# Patient Record
Sex: MALE | Race: OTHER | NOT HISPANIC OR LATINO | URBAN - METROPOLITAN AREA
[De-identification: names, ages, dates, MRNs, and addresses within clinical notes are randomized per-mention and may not be internally consistent; named-entity substitution may affect disease eponyms.]

---

## 2017-04-25 ENCOUNTER — INPATIENT (INPATIENT)
Facility: HOSPITAL | Age: 67
LOS: 1 days | Discharge: HOME | DRG: 195 | End: 2017-04-27
Attending: INTERNAL MEDICINE | Admitting: INTERNAL MEDICINE
Payer: COMMERCIAL

## 2017-04-25 VITALS
RESPIRATION RATE: 22 BRPM | DIASTOLIC BLOOD PRESSURE: 116 MMHG | HEART RATE: 96 BPM | OXYGEN SATURATION: 96 % | TEMPERATURE: 98 F | SYSTOLIC BLOOD PRESSURE: 188 MMHG

## 2017-04-25 DIAGNOSIS — Z90.89 ACQUIRED ABSENCE OF OTHER ORGANS: Chronic | ICD-10-CM

## 2017-04-25 DIAGNOSIS — J45.909 UNSPECIFIED ASTHMA, UNCOMPLICATED: ICD-10-CM

## 2017-04-25 LAB
ALBUMIN SERPL ELPH-MCNC: 4.4 G/DL — SIGNIFICANT CHANGE UP (ref 3.3–5)
ALP SERPL-CCNC: 53 U/L — SIGNIFICANT CHANGE UP (ref 40–120)
ALT FLD-CCNC: 41 U/L RC — SIGNIFICANT CHANGE UP (ref 10–45)
ANION GAP SERPL CALC-SCNC: 18 MMOL/L — HIGH (ref 5–17)
AST SERPL-CCNC: 47 U/L — HIGH (ref 10–40)
BASOPHILS # BLD AUTO: 0 K/UL — SIGNIFICANT CHANGE UP (ref 0–0.2)
BASOPHILS NFR BLD AUTO: 0.5 % — SIGNIFICANT CHANGE UP (ref 0–2)
BILIRUB SERPL-MCNC: 0.8 MG/DL — SIGNIFICANT CHANGE UP (ref 0.2–1.2)
BUN SERPL-MCNC: 14 MG/DL — SIGNIFICANT CHANGE UP (ref 7–23)
CALCIUM SERPL-MCNC: 9.1 MG/DL — SIGNIFICANT CHANGE UP (ref 8.4–10.5)
CHLORIDE SERPL-SCNC: 96 MMOL/L — SIGNIFICANT CHANGE UP (ref 96–108)
CO2 SERPL-SCNC: 23 MMOL/L — SIGNIFICANT CHANGE UP (ref 22–31)
CREAT SERPL-MCNC: 1.09 MG/DL — SIGNIFICANT CHANGE UP (ref 0.5–1.3)
EOSINOPHIL # BLD AUTO: 0.3 K/UL — SIGNIFICANT CHANGE UP (ref 0–0.5)
EOSINOPHIL NFR BLD AUTO: 3.4 % — SIGNIFICANT CHANGE UP (ref 0–6)
GLUCOSE SERPL-MCNC: 127 MG/DL — HIGH (ref 70–99)
HCT VFR BLD CALC: 48.1 % — SIGNIFICANT CHANGE UP (ref 39–50)
HGB BLD-MCNC: 16.8 G/DL — SIGNIFICANT CHANGE UP (ref 13–17)
LYMPHOCYTES # BLD AUTO: 1.2 K/UL — SIGNIFICANT CHANGE UP (ref 1–3.3)
LYMPHOCYTES # BLD AUTO: 13.9 % — SIGNIFICANT CHANGE UP (ref 13–44)
MAGNESIUM SERPL-MCNC: 2.2 MG/DL — SIGNIFICANT CHANGE UP (ref 1.6–2.6)
MCHC RBC-ENTMCNC: 33.7 PG — SIGNIFICANT CHANGE UP (ref 27–34)
MCHC RBC-ENTMCNC: 35 GM/DL — SIGNIFICANT CHANGE UP (ref 32–36)
MCV RBC AUTO: 96.3 FL — SIGNIFICANT CHANGE UP (ref 80–100)
MONOCYTES # BLD AUTO: 0.8 K/UL — SIGNIFICANT CHANGE UP (ref 0–0.9)
MONOCYTES NFR BLD AUTO: 9.3 % — SIGNIFICANT CHANGE UP (ref 2–14)
NEUTROPHILS # BLD AUTO: 6.6 K/UL — SIGNIFICANT CHANGE UP (ref 1.8–7.4)
NEUTROPHILS NFR BLD AUTO: 72.9 % — SIGNIFICANT CHANGE UP (ref 43–77)
PHOSPHATE SERPL-MCNC: 3.3 MG/DL — SIGNIFICANT CHANGE UP (ref 2.5–4.5)
PLATELET # BLD AUTO: 221 K/UL — SIGNIFICANT CHANGE UP (ref 150–400)
POTASSIUM SERPL-MCNC: 5.3 MMOL/L — SIGNIFICANT CHANGE UP (ref 3.5–5.3)
POTASSIUM SERPL-SCNC: 5.3 MMOL/L — SIGNIFICANT CHANGE UP (ref 3.5–5.3)
PROT SERPL-MCNC: 8 G/DL — SIGNIFICANT CHANGE UP (ref 6–8.3)
RAPID RVP RESULT: DETECTED
RBC # BLD: 5 M/UL — SIGNIFICANT CHANGE UP (ref 4.2–5.8)
RBC # FLD: 11.8 % — SIGNIFICANT CHANGE UP (ref 10.3–14.5)
RV+EV RNA SPEC QL NAA+PROBE: DETECTED
SODIUM SERPL-SCNC: 137 MMOL/L — SIGNIFICANT CHANGE UP (ref 135–145)
WBC # BLD: 9 K/UL — SIGNIFICANT CHANGE UP (ref 3.8–10.5)
WBC # FLD AUTO: 9 K/UL — SIGNIFICANT CHANGE UP (ref 3.8–10.5)

## 2017-04-25 PROCEDURE — 71020: CPT | Mod: 26

## 2017-04-25 PROCEDURE — 93010 ELECTROCARDIOGRAM REPORT: CPT | Mod: NC

## 2017-04-25 PROCEDURE — 99285 EMERGENCY DEPT VISIT HI MDM: CPT | Mod: 25

## 2017-04-25 RX ORDER — LEVOTHYROXINE SODIUM 125 MCG
88 TABLET ORAL DAILY
Qty: 0 | Refills: 0 | Status: DISCONTINUED | OUTPATIENT
Start: 2017-04-25 | End: 2017-04-27

## 2017-04-25 RX ORDER — SIMVASTATIN 20 MG/1
0 TABLET, FILM COATED ORAL
Qty: 0 | Refills: 0 | COMMUNITY

## 2017-04-25 RX ORDER — ASPIRIN/CALCIUM CARB/MAGNESIUM 324 MG
81 TABLET ORAL DAILY
Qty: 0 | Refills: 0 | Status: DISCONTINUED | OUTPATIENT
Start: 2017-04-25 | End: 2017-04-26

## 2017-04-25 RX ORDER — IPRATROPIUM/ALBUTEROL SULFATE 18-103MCG
3 AEROSOL WITH ADAPTER (GRAM) INHALATION ONCE
Qty: 0 | Refills: 0 | Status: DISCONTINUED | OUTPATIENT
Start: 2017-04-25 | End: 2017-04-25

## 2017-04-25 RX ORDER — ENOXAPARIN SODIUM 100 MG/ML
40 INJECTION SUBCUTANEOUS DAILY
Qty: 0 | Refills: 0 | Status: DISCONTINUED | OUTPATIENT
Start: 2017-04-25 | End: 2017-04-26

## 2017-04-25 RX ORDER — METOPROLOL TARTRATE 50 MG
0 TABLET ORAL
Qty: 0 | Refills: 0 | COMMUNITY

## 2017-04-25 RX ORDER — ATORVASTATIN CALCIUM 80 MG/1
10 TABLET, FILM COATED ORAL AT BEDTIME
Qty: 0 | Refills: 0 | Status: DISCONTINUED | OUTPATIENT
Start: 2017-04-25 | End: 2017-04-27

## 2017-04-25 RX ORDER — LEVOTHYROXINE SODIUM 125 MCG
0 TABLET ORAL
Qty: 0 | Refills: 0 | COMMUNITY

## 2017-04-25 RX ORDER — TIOTROPIUM BROMIDE 18 UG/1
1 CAPSULE ORAL; RESPIRATORY (INHALATION) DAILY
Qty: 0 | Refills: 0 | Status: DISCONTINUED | OUTPATIENT
Start: 2017-04-25 | End: 2017-04-27

## 2017-04-25 RX ORDER — IPRATROPIUM/ALBUTEROL SULFATE 18-103MCG
3 AEROSOL WITH ADAPTER (GRAM) INHALATION
Qty: 0 | Refills: 0 | Status: COMPLETED | OUTPATIENT
Start: 2017-04-25 | End: 2017-04-25

## 2017-04-25 RX ORDER — ALBUTEROL 90 UG/1
1 AEROSOL, METERED ORAL EVERY 4 HOURS
Qty: 0 | Refills: 0 | Status: DISCONTINUED | OUTPATIENT
Start: 2017-04-25 | End: 2017-04-27

## 2017-04-25 RX ORDER — IPRATROPIUM/ALBUTEROL SULFATE 18-103MCG
3 AEROSOL WITH ADAPTER (GRAM) INHALATION EVERY 6 HOURS
Qty: 0 | Refills: 0 | Status: DISCONTINUED | OUTPATIENT
Start: 2017-04-25 | End: 2017-04-27

## 2017-04-25 RX ORDER — METOPROLOL TARTRATE 50 MG
50 TABLET ORAL DAILY
Qty: 0 | Refills: 0 | Status: DISCONTINUED | OUTPATIENT
Start: 2017-04-25 | End: 2017-04-27

## 2017-04-25 RX ADMIN — Medication 40 MILLIGRAM(S): at 12:57

## 2017-04-25 RX ADMIN — Medication 3 MILLILITER(S): at 14:49

## 2017-04-25 RX ADMIN — Medication 60 MILLIGRAM(S): at 08:01

## 2017-04-25 RX ADMIN — Medication 50 MILLIGRAM(S): at 19:05

## 2017-04-25 RX ADMIN — ATORVASTATIN CALCIUM 10 MILLIGRAM(S): 80 TABLET, FILM COATED ORAL at 21:22

## 2017-04-25 RX ADMIN — Medication 3 MILLILITER(S): at 06:45

## 2017-04-25 RX ADMIN — Medication 3 MILLILITER(S): at 20:18

## 2017-04-25 RX ADMIN — Medication 20 MILLIGRAM(S): at 21:22

## 2017-04-25 RX ADMIN — Medication 3 MILLILITER(S): at 06:44

## 2017-04-25 RX ADMIN — Medication 3 MILLILITER(S): at 06:46

## 2017-04-25 RX ADMIN — Medication 81 MILLIGRAM(S): at 14:38

## 2017-04-25 RX ADMIN — Medication 20 MILLIGRAM(S): at 17:53

## 2017-04-25 NOTE — ED PROVIDER NOTE - NS ED ROS FT
ROS: No fever/chills, no eye pain, no throat pain, no chest pain, pos shortness of breath / wheezing, no abdominal pain,  no dysuria, no muscle pain, no rashes, no focal neurologic complaints, no known mental health issues

## 2017-04-25 NOTE — H&P ADULT. - ASSESSMENT
pt  with  sob,   from  acute  bronhospasm/  wheezing,  nebs,  iv  solumedrol,  htn, .  dvt  ppx,   echo

## 2017-04-25 NOTE — ED PROVIDER NOTE - PHYSICAL EXAMINATION
Lg: A & O x 3, NAD, HEENT WNL and no facial asymmetry; lungs with bilateral wheezing and prolonged expiratory phase, heart with reg rhythm without murmur; abdomen soft obese NTND; extremities with no edema; skin with no rashes, neuro exam non focal with no motor or sensory deficits

## 2017-04-25 NOTE — H&P ADULT. - RS GEN PE MLT RESP DETAILS PC
no rales/no chest wall tenderness/respirations non-labored/wheezes/no intercostal retractions/airway patent/clear to auscultation bilaterally/normal/breath sounds equal

## 2017-04-25 NOTE — H&P ADULT. - HISTORY OF PRESENT ILLNESS
66 year old with shortness of breath and wheezing. No history of asthma or smoking but has had suboptimal breathing studies with his PMD as per patient. Patient presented with cough yesterday without fever. woke this morning with phlegm and persistent wheezing. patient states he slept almost 1 hour last night because of persistent coughing. 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 no sick contacts at home or work,  .  SEEN  IN ERR,  WITH  WHEEZING, WIFE  AT BEDSIDE,  MORBID  OBESITY, HTN,  HYPOTHYROID   PMD: Rod Mckee

## 2017-04-25 NOTE — ED ADULT NURSE NOTE - OBJECTIVE STATEMENT
65 y/o male presents to ED c/o difficulty breathing. Pt states that yesterday he began to have intermittent cough, productive w/ thick sputum. Cough continued today and pt now presents w/ audible wheezes BL, inspiratory and expiratory. Pt denies smoking history, no pmh asthma, COPD. Pt states that he had bronchitis as child but not recently. Pt denies CP, n/v, fever/chills, no numbness/tingling in extremities.

## 2017-04-25 NOTE — ED PROVIDER NOTE - ATTENDING CONTRIBUTION TO CARE
67yoM without hx of asthma/copd presents to ED with dyspnea, cough. hx abnormal pulm function tests per pt.   On exam, pt hypoxic on RA, diffuse wheezing.  A: Wheezing, hypoxia - possibly undiagnosed asthma/copd, pna  P: labs, cxr, steroids, nebs, reeval for dispo.

## 2017-04-25 NOTE — PATIENT PROFILE ADULT. - REASON FOR ADMISSION
c/o cough, and difficulty breathing at home starting 4/24/17, denies recent travel outside the country.

## 2017-04-25 NOTE — ED PROVIDER NOTE - OBJECTIVE STATEMENT
66 year old with shortness of breath and wheezing. No history of asthma or smoking but has had suboptimal breathing studies with his PMD as per patient. Patient presented with cough yesterday without fever. woke this morning with phlegm and persistent wheezing. patient states he slept almost 1 hour last night because of persistent coughing. 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 no sick contacts at home or work.     PMD: Rod Mckee

## 2017-04-25 NOTE — ED PROVIDER NOTE - PROGRESS NOTE DETAILS
Walker Dougherty DO: Pt reassessed, Diffuse wheezing. On 2L NC, pulse ox 99% (was placed when pulse ox 93% RA before deep breaths.) Resident to contact PMD. Will likely need CDU vs admit if does not improve soon. Signed out to Dr. Rey.

## 2017-04-26 LAB
ANION GAP SERPL CALC-SCNC: 18 MMOL/L — HIGH (ref 5–17)
BUN SERPL-MCNC: 11 MG/DL — SIGNIFICANT CHANGE UP (ref 7–23)
CALCIUM SERPL-MCNC: 9.6 MG/DL — SIGNIFICANT CHANGE UP (ref 8.4–10.5)
CHLORIDE SERPL-SCNC: 102 MMOL/L — SIGNIFICANT CHANGE UP (ref 96–108)
CHOLEST SERPL-MCNC: 161 MG/DL — SIGNIFICANT CHANGE UP (ref 10–199)
CO2 SERPL-SCNC: 22 MMOL/L — SIGNIFICANT CHANGE UP (ref 22–31)
CREAT SERPL-MCNC: 0.84 MG/DL — SIGNIFICANT CHANGE UP (ref 0.5–1.3)
GLUCOSE SERPL-MCNC: 128 MG/DL — HIGH (ref 70–99)
HCT VFR BLD CALC: 49 % — SIGNIFICANT CHANGE UP (ref 39–50)
HDLC SERPL-MCNC: 77 MG/DL — SIGNIFICANT CHANGE UP (ref 40–125)
HGB BLD-MCNC: 16.5 G/DL — SIGNIFICANT CHANGE UP (ref 13–17)
LIPID PNL WITH DIRECT LDL SERPL: 69 MG/DL — SIGNIFICANT CHANGE UP
MCHC RBC-ENTMCNC: 33.1 PG — SIGNIFICANT CHANGE UP (ref 27–34)
MCHC RBC-ENTMCNC: 33.7 GM/DL — SIGNIFICANT CHANGE UP (ref 32–36)
MCV RBC AUTO: 98.4 FL — SIGNIFICANT CHANGE UP (ref 80–100)
PLATELET # BLD AUTO: 257 K/UL — SIGNIFICANT CHANGE UP (ref 150–400)
POTASSIUM SERPL-MCNC: 4.7 MMOL/L — SIGNIFICANT CHANGE UP (ref 3.5–5.3)
POTASSIUM SERPL-SCNC: 4.7 MMOL/L — SIGNIFICANT CHANGE UP (ref 3.5–5.3)
RBC # BLD: 4.98 M/UL — SIGNIFICANT CHANGE UP (ref 4.2–5.8)
RBC # FLD: 12.9 % — SIGNIFICANT CHANGE UP (ref 10.3–14.5)
SODIUM SERPL-SCNC: 142 MMOL/L — SIGNIFICANT CHANGE UP (ref 135–145)
TOTAL CHOLESTEROL/HDL RATIO MEASUREMENT: 2.1 RATIO — LOW (ref 3.4–9.6)
TRIGL SERPL-MCNC: 73 MG/DL — SIGNIFICANT CHANGE UP (ref 10–149)
TSH SERPL-MCNC: 1.5 UIU/ML — SIGNIFICANT CHANGE UP (ref 0.27–4.2)
WBC # BLD: 13.4 K/UL — HIGH (ref 3.8–10.5)
WBC # FLD AUTO: 13.4 K/UL — HIGH (ref 3.8–10.5)

## 2017-04-26 PROCEDURE — 93306 TTE W/DOPPLER COMPLETE: CPT | Mod: 26

## 2017-04-26 RX ADMIN — Medication 20 MILLIGRAM(S): at 22:07

## 2017-04-26 RX ADMIN — Medication 20 MILLIGRAM(S): at 14:18

## 2017-04-26 RX ADMIN — Medication 88 MICROGRAM(S): at 06:04

## 2017-04-26 RX ADMIN — Medication 3 MILLILITER(S): at 08:50

## 2017-04-26 RX ADMIN — Medication 3 MILLILITER(S): at 23:44

## 2017-04-26 RX ADMIN — Medication 3 MILLILITER(S): at 12:52

## 2017-04-26 RX ADMIN — Medication 50 MILLIGRAM(S): at 06:04

## 2017-04-26 RX ADMIN — Medication 20 MILLIGRAM(S): at 06:04

## 2017-04-26 RX ADMIN — ATORVASTATIN CALCIUM 10 MILLIGRAM(S): 80 TABLET, FILM COATED ORAL at 22:07

## 2017-04-26 RX ADMIN — Medication 3 MILLILITER(S): at 03:30

## 2017-04-26 RX ADMIN — Medication 3 MILLILITER(S): at 18:19

## 2017-04-26 NOTE — PROVIDER CONTACT NOTE (OTHER) - SITUATION
pt requesting to speak to provider as they spoke to outpatient dr and is confused about orders. Pt is concerned that they was a name mix up as outpatient doctor has many pts by the same name

## 2017-04-27 VITALS
TEMPERATURE: 98 F | OXYGEN SATURATION: 95 % | DIASTOLIC BLOOD PRESSURE: 82 MMHG | SYSTOLIC BLOOD PRESSURE: 128 MMHG | HEART RATE: 65 BPM | RESPIRATION RATE: 18 BRPM

## 2017-04-27 PROCEDURE — 83735 ASSAY OF MAGNESIUM: CPT

## 2017-04-27 PROCEDURE — 93306 TTE W/DOPPLER COMPLETE: CPT

## 2017-04-27 PROCEDURE — 80048 BASIC METABOLIC PNL TOTAL CA: CPT

## 2017-04-27 PROCEDURE — 80061 LIPID PANEL: CPT

## 2017-04-27 PROCEDURE — 85027 COMPLETE CBC AUTOMATED: CPT

## 2017-04-27 PROCEDURE — 87798 DETECT AGENT NOS DNA AMP: CPT

## 2017-04-27 PROCEDURE — 71046 X-RAY EXAM CHEST 2 VIEWS: CPT

## 2017-04-27 PROCEDURE — 87581 M.PNEUMON DNA AMP PROBE: CPT

## 2017-04-27 PROCEDURE — 84100 ASSAY OF PHOSPHORUS: CPT

## 2017-04-27 PROCEDURE — 99285 EMERGENCY DEPT VISIT HI MDM: CPT | Mod: 25

## 2017-04-27 PROCEDURE — 93005 ELECTROCARDIOGRAM TRACING: CPT

## 2017-04-27 PROCEDURE — 84443 ASSAY THYROID STIM HORMONE: CPT

## 2017-04-27 PROCEDURE — 87486 CHLMYD PNEUM DNA AMP PROBE: CPT

## 2017-04-27 PROCEDURE — 87633 RESP VIRUS 12-25 TARGETS: CPT

## 2017-04-27 PROCEDURE — 94640 AIRWAY INHALATION TREATMENT: CPT

## 2017-04-27 PROCEDURE — 80053 COMPREHEN METABOLIC PANEL: CPT

## 2017-04-27 RX ORDER — ALBUTEROL 90 UG/1
2 AEROSOL, METERED ORAL
Qty: 1 | Refills: 0 | OUTPATIENT
Start: 2017-04-27 | End: 2017-05-12

## 2017-04-27 RX ORDER — IPRATROPIUM/ALBUTEROL SULFATE 18-103MCG
3 AEROSOL WITH ADAPTER (GRAM) INHALATION
Qty: 60 | Refills: 0 | OUTPATIENT
Start: 2017-04-27 | End: 2017-05-12

## 2017-04-27 RX ADMIN — Medication 20 MILLIGRAM(S): at 05:34

## 2017-04-27 RX ADMIN — Medication 50 MILLIGRAM(S): at 05:34

## 2017-04-27 RX ADMIN — Medication 3 MILLILITER(S): at 05:35

## 2017-04-27 RX ADMIN — Medication 3 MILLILITER(S): at 11:33

## 2017-04-27 RX ADMIN — Medication 88 MICROGRAM(S): at 05:34

## 2017-04-27 NOTE — DISCHARGE NOTE ADULT - PLAN OF CARE
improved monitor for fever  take medrol dose pack steroid as directed  continue breathing treatment as prescribed continue your thyroid medication continue your blood pressure medication

## 2017-04-27 NOTE — DISCHARGE NOTE ADULT - REASON FOR ADMISSION
c/o cough, and difficulty breathing at home starting 4/24/17, denies recent travel outside the country. c/o cough, and difficulty breathing at home.

## 2017-04-27 NOTE — DISCHARGE NOTE ADULT - PATIENT PORTAL LINK FT
“You can access the [employer] Patient Portal, offered by [facility], by registering with the following website: [email or web address]”

## 2017-04-27 NOTE — DISCHARGE NOTE ADULT - MEDICATION SUMMARY - MEDICATIONS TO TAKE
I will START or STAY ON the medications listed below when I get home from the hospital:    Medrol Dosepak 4 mg oral tablet  -- 1 dose(s) by mouth once a day  -- as directed  -- Indication: For Viral pneumonia    atorvastatin 10 mg oral tablet  -- 1 tab(s) by mouth once a day  -- Indication: For cholesterol    metoprolol succinate 50 mg oral tablet, extended release  -- 1 tab(s) by mouth once a day  -- Indication: For Hypertension    albuterol-ipratropium 2.5 mg-0.5 mg/3 mL inhalation solution  -- 3 milliliter(s) inhaled every 6 hours  -- Indication: For bronchodilator    albuterol 90 mcg/inh inhalation aerosol  -- 2 puff(s) inhaled 4 times a day, As Needed  -- as needed for wheezing  -- Indication: For bronchodilator    levothyroxine 88 mcg (0.088 mg) oral tablet  -- 1 tab(s) by mouth once a day  -- Indication: For Hypothyroid

## 2017-04-27 NOTE — DISCHARGE NOTE ADULT - CARE PLAN
Principal Discharge DX:	Viral pneumonia  Goal:	improved  Instructions for follow-up, activity and diet:	monitor for fever  take medrol dose pack steroid as directed  continue breathing treatment as prescribed  Secondary Diagnosis:	Hypothyroid  Instructions for follow-up, activity and diet:	continue your thyroid medication  Secondary Diagnosis:	Hypertension  Instructions for follow-up, activity and diet:	continue your blood pressure medication

## 2017-04-27 NOTE — DISCHARGE NOTE ADULT - HOSPITAL COURSE
to be completed by md 66 year old male PMH hypothyroidism, HTN, HLD to ER with SOB, wheeze and cough. CXR was clear. SMA-7 and CBC WNL. RVP panel was positive. EKG was NSR. TTE showed normal LV and RV function with EF 65 %.     Responded well to IV steroids and nebulizers. Likely had viral pneumonia.     Discharged to home on medrol dose pack and Nebs at home. See attached med list.

## 2017-04-27 NOTE — DISCHARGE NOTE ADULT - CARE PROVIDER_API CALL
Rod Mckee), Internal Medicine  [address] [place] [id number]  Phone: [phone]  Fax: [phone]

## 2017-04-27 NOTE — DISCHARGE NOTE ADULT - MEDICATION SUMMARY - MEDICATIONS TO STOP TAKING
I will STOP taking the medications listed below when I get home from the hospital:    betamethasone dipropionate 0.05% topical ointment  -- Apply on skin to affected area 2 times a day

## 2019-01-06 ENCOUNTER — TRANSCRIPTION ENCOUNTER (OUTPATIENT)
Age: 69
End: 2019-01-06

## 2019-01-07 ENCOUNTER — TRANSCRIPTION ENCOUNTER (OUTPATIENT)
Age: 69
End: 2019-01-07

## 2019-01-24 ENCOUNTER — EMERGENCY (EMERGENCY)
Facility: HOSPITAL | Age: 69
LOS: 1 days | Discharge: HOME | End: 2019-01-24
Attending: EMERGENCY MEDICINE
Payer: COMMERCIAL

## 2019-01-24 VITALS
HEIGHT: 74 IN | WEIGHT: 259.93 LBS | HEART RATE: 91 BPM | RESPIRATION RATE: 18 BRPM | SYSTOLIC BLOOD PRESSURE: 176 MMHG | OXYGEN SATURATION: 95 % | TEMPERATURE: 98 F | DIASTOLIC BLOOD PRESSURE: 119 MMHG

## 2019-01-24 DIAGNOSIS — Z90.89 ACQUIRED ABSENCE OF OTHER ORGANS: Chronic | ICD-10-CM

## 2019-01-24 LAB
ALBUMIN SERPL ELPH-MCNC: 4.5 G/DL — SIGNIFICANT CHANGE UP (ref 3.3–5)
ALP SERPL-CCNC: 47 U/L — SIGNIFICANT CHANGE UP (ref 40–120)
ALT FLD-CCNC: 31 U/L — SIGNIFICANT CHANGE UP (ref 10–45)
ANION GAP SERPL CALC-SCNC: 14 MMOL/L — SIGNIFICANT CHANGE UP (ref 5–17)
APTT BLD: 28 SEC — SIGNIFICANT CHANGE UP (ref 27.5–36.3)
AST SERPL-CCNC: 29 U/L — SIGNIFICANT CHANGE UP (ref 10–40)
BASE EXCESS BLDV CALC-SCNC: -0.5 MMOL/L — SIGNIFICANT CHANGE UP (ref -2–2)
BASOPHILS # BLD AUTO: 0.1 K/UL — SIGNIFICANT CHANGE UP (ref 0–0.2)
BASOPHILS NFR BLD AUTO: 0.5 % — SIGNIFICANT CHANGE UP (ref 0–2)
BILIRUB SERPL-MCNC: 1.2 MG/DL — SIGNIFICANT CHANGE UP (ref 0.2–1.2)
BUN SERPL-MCNC: 19 MG/DL — SIGNIFICANT CHANGE UP (ref 7–23)
CA-I SERPL-SCNC: 1.18 MMOL/L — SIGNIFICANT CHANGE UP (ref 1.12–1.3)
CALCIUM SERPL-MCNC: 9.1 MG/DL — SIGNIFICANT CHANGE UP (ref 8.4–10.5)
CHLORIDE BLDV-SCNC: 103 MMOL/L — SIGNIFICANT CHANGE UP (ref 96–108)
CHLORIDE SERPL-SCNC: 101 MMOL/L — SIGNIFICANT CHANGE UP (ref 96–108)
CO2 BLDV-SCNC: 25 MMOL/L — SIGNIFICANT CHANGE UP (ref 22–30)
CO2 SERPL-SCNC: 22 MMOL/L — SIGNIFICANT CHANGE UP (ref 22–31)
CREAT SERPL-MCNC: 0.95 MG/DL — SIGNIFICANT CHANGE UP (ref 0.5–1.3)
EOSINOPHIL # BLD AUTO: 0.8 K/UL — HIGH (ref 0–0.5)
EOSINOPHIL NFR BLD AUTO: 7.1 % — HIGH (ref 0–6)
GAS PNL BLDV: 134 MMOL/L — LOW (ref 136–145)
GAS PNL BLDV: SIGNIFICANT CHANGE UP
GAS PNL BLDV: SIGNIFICANT CHANGE UP
GLUCOSE BLDV-MCNC: 109 MG/DL — HIGH (ref 70–99)
GLUCOSE SERPL-MCNC: 113 MG/DL — HIGH (ref 70–99)
HCO3 BLDV-SCNC: 24 MMOL/L — SIGNIFICANT CHANGE UP (ref 21–29)
HCT VFR BLD CALC: 47.3 % — SIGNIFICANT CHANGE UP (ref 39–50)
HCT VFR BLDA CALC: 51 % — HIGH (ref 39–50)
HGB BLD CALC-MCNC: 16.7 G/DL — SIGNIFICANT CHANGE UP (ref 13–17)
HGB BLD-MCNC: 16.4 G/DL — SIGNIFICANT CHANGE UP (ref 13–17)
INR BLD: 1.01 RATIO — SIGNIFICANT CHANGE UP (ref 0.88–1.16)
LACTATE BLDV-MCNC: 1.4 MMOL/L — SIGNIFICANT CHANGE UP (ref 0.7–2)
LYMPHOCYTES # BLD AUTO: 1.7 K/UL — SIGNIFICANT CHANGE UP (ref 1–3.3)
LYMPHOCYTES # BLD AUTO: 14.2 % — SIGNIFICANT CHANGE UP (ref 13–44)
MCHC RBC-ENTMCNC: 33.8 PG — SIGNIFICANT CHANGE UP (ref 27–34)
MCHC RBC-ENTMCNC: 34.7 GM/DL — SIGNIFICANT CHANGE UP (ref 32–36)
MCV RBC AUTO: 97.4 FL — SIGNIFICANT CHANGE UP (ref 80–100)
MONOCYTES # BLD AUTO: 0.9 K/UL — SIGNIFICANT CHANGE UP (ref 0–0.9)
MONOCYTES NFR BLD AUTO: 7.3 % — SIGNIFICANT CHANGE UP (ref 2–14)
NEUTROPHILS # BLD AUTO: 8.4 K/UL — HIGH (ref 1.8–7.4)
NEUTROPHILS NFR BLD AUTO: 70.8 % — SIGNIFICANT CHANGE UP (ref 43–77)
OTHER CELLS CSF MANUAL: 22 ML/DL — SIGNIFICANT CHANGE UP (ref 18–22)
PCO2 BLDV: 40 MMHG — SIGNIFICANT CHANGE UP (ref 35–50)
PH BLDV: 7.39 — SIGNIFICANT CHANGE UP (ref 7.35–7.45)
PLATELET # BLD AUTO: 229 K/UL — SIGNIFICANT CHANGE UP (ref 150–400)
PO2 BLDV: 74 MMHG — HIGH (ref 25–45)
POTASSIUM BLDV-SCNC: 3.6 MMOL/L — SIGNIFICANT CHANGE UP (ref 3.5–5.3)
POTASSIUM SERPL-MCNC: 3.7 MMOL/L — SIGNIFICANT CHANGE UP (ref 3.5–5.3)
POTASSIUM SERPL-SCNC: 3.7 MMOL/L — SIGNIFICANT CHANGE UP (ref 3.5–5.3)
PROT SERPL-MCNC: 7.6 G/DL — SIGNIFICANT CHANGE UP (ref 6–8.3)
PROTHROM AB SERPL-ACNC: 11.6 SEC — SIGNIFICANT CHANGE UP (ref 10–12.9)
RBC # BLD: 4.86 M/UL — SIGNIFICANT CHANGE UP (ref 4.2–5.8)
RBC # FLD: 11.6 % — SIGNIFICANT CHANGE UP (ref 10.3–14.5)
SAO2 % BLDV: 95 % — HIGH (ref 67–88)
SODIUM SERPL-SCNC: 137 MMOL/L — SIGNIFICANT CHANGE UP (ref 135–145)
WBC # BLD: 11.8 K/UL — HIGH (ref 3.8–10.5)
WBC # FLD AUTO: 11.8 K/UL — HIGH (ref 3.8–10.5)

## 2019-01-24 PROCEDURE — 71046 X-RAY EXAM CHEST 2 VIEWS: CPT | Mod: 26

## 2019-01-24 PROCEDURE — 93010 ELECTROCARDIOGRAM REPORT: CPT | Mod: 59

## 2019-01-24 PROCEDURE — 99285 EMERGENCY DEPT VISIT HI MDM: CPT | Mod: 25

## 2019-01-24 RX ORDER — IPRATROPIUM/ALBUTEROL SULFATE 18-103MCG
3 AEROSOL WITH ADAPTER (GRAM) INHALATION
Qty: 0 | Refills: 0 | Status: COMPLETED | OUTPATIENT
Start: 2019-01-24 | End: 2019-01-24

## 2019-01-24 RX ADMIN — Medication 3 MILLILITER(S): at 11:00

## 2019-01-24 RX ADMIN — Medication 3 MILLILITER(S): at 22:20

## 2019-01-24 RX ADMIN — Medication 125 MILLIGRAM(S): at 22:50

## 2019-01-24 RX ADMIN — Medication 3 MILLILITER(S): at 22:50

## 2019-01-24 NOTE — ED ADULT NURSE NOTE - OBJECTIVE STATEMENT
69 y/o male presents to ED c.o URI symptoms, nasal congestion, difficulty breathing x 3 weeks. Pt says he has been to PCP and urgent care 2x each. Had xrays and blood work done which suggested pt had viral bronchitis. Was given nebulizer treatments which pt says has helped his labored breathing. Also given course of doxy and medrol pack. Symptoms haven't been worsening but haven't been getting better so pt came in to ED for further treatment. He says he feels chest tightness, "like I have a lot of mucous and phlegm." Nothing worsens Upon arrival, pt A&O x 3. Gross motor and neuro intact. Wheezing auscultated b/l. 69 y/o male presents to ED c.o URI symptoms, nasal congestion, difficulty breathing x 3 weeks. Pt says he has been to PCP and urgent care 2x each. Had xrays and blood work done which suggested pt had viral bronchitis. Was given nebulizer treatments which pt says has helped his labored breathing. Also given course of doxy and medrol pack. Symptoms haven't been worsening but haven't been getting better so pt came in to ED for further treatment. He says he feels chest tightness, "like I have a lot of mucous and phlegm." Nothing worsens Upon arrival, pt A&O x 3. Gross motor and neuro intact. Wheezing auscultated b/l. No respiratory distress noted. Safety and comfort provided.

## 2019-01-24 NOTE — ED PROVIDER NOTE - OBJECTIVE STATEMENT
68 year old male w/ pmhx HTN, HLD, hypothyroid presents to ED c/o persistent sob for 3 weeks associated with cough. Patient reports he had onset of symptoms approx 3 weeks ago and has been seen by PCP 2 times and urgent care 2 times since onset diagnosed with bronchitis and has finished course of Doxy and medrol dose pack since onset and has also been using nebulizer at home. He states he was having some relief but came to ED for persistent symptoms. Denies fevers, chills, chest pain, abdominal pain, n/v/d, urinary symptoms

## 2019-01-24 NOTE — ED PROVIDER NOTE - PROGRESS NOTE DETAILS
pt re-evaluated. O2 sat ranging from between 94-96. pt ambulated, with sat dropping to 90-91. pt with continued wheeze. due to this, plan to hold in cdu for serial nebs, re-eval in morning. plan d/w patient and wife who are agreeable. - Bao Hale MD

## 2019-01-24 NOTE — ED PROVIDER NOTE - ATTENDING CONTRIBUTION TO CARE
68M, pmh htn, hld, hypothyroidism, presents with cough, sob, x 3 weeks. pt seen by pmd x 2, also urgent care. has completed course of doxycycline and medrol dose pack, which did improve sx somewhat but worsened over last few days. also has been using nebulizer at home with some relief. cough non-productive. denies any recent travel, hemoptysis, night sweats, unexpected weight loss. denies f/c, cp, abd pain, n/v/d.    PE: NAD, NCAT, MMM, Trachea midline, Normal conjunctiva, lungs with decreased air movement, diffuse expiratory wheeze, S1/S2 RRR, Normal perfusion, 2+ radial pulses bilat, Abdomen Soft, NTND, No rebound/guarding, No LE edema, No deformity of extremities, No rashes,  No focal motor or sensory deficits.    O2 sat mid 90's, hypertensive. Exam sig for diffuse expiratory wheeze. Most c/w persistent URI/bronchitis, ?undiagnosed copd/asthma. EKG without obvious acute ischemic changes, no chest pain, very low suspicion of cardiac etiology. Also consider PE given persistence of cough, sob, check cta chest which will also eval for consolidation. Check CBC eval for anemia, cmp eval for metabolic derangement. RVP. Give nebs, steroids. Re-eval - Bao Hale MD

## 2019-01-25 VITALS
TEMPERATURE: 98 F | RESPIRATION RATE: 22 BRPM | DIASTOLIC BLOOD PRESSURE: 86 MMHG | SYSTOLIC BLOOD PRESSURE: 137 MMHG | OXYGEN SATURATION: 98 % | HEART RATE: 74 BPM

## 2019-01-25 PROBLEM — E03.9 HYPOTHYROIDISM, UNSPECIFIED: Chronic | Status: ACTIVE | Noted: 2017-04-25

## 2019-01-25 PROBLEM — I10 ESSENTIAL (PRIMARY) HYPERTENSION: Chronic | Status: ACTIVE | Noted: 2017-04-25

## 2019-01-25 LAB
RAPID RVP RESULT: DETECTED
RV+EV RNA SPEC QL NAA+PROBE: DETECTED

## 2019-01-25 PROCEDURE — 85027 COMPLETE CBC AUTOMATED: CPT

## 2019-01-25 PROCEDURE — 83605 ASSAY OF LACTIC ACID: CPT

## 2019-01-25 PROCEDURE — 93005 ELECTROCARDIOGRAM TRACING: CPT

## 2019-01-25 PROCEDURE — G0378: CPT

## 2019-01-25 PROCEDURE — 85014 HEMATOCRIT: CPT

## 2019-01-25 PROCEDURE — 84132 ASSAY OF SERUM POTASSIUM: CPT

## 2019-01-25 PROCEDURE — 99285 EMERGENCY DEPT VISIT HI MDM: CPT | Mod: 25

## 2019-01-25 PROCEDURE — 71275 CT ANGIOGRAPHY CHEST: CPT

## 2019-01-25 PROCEDURE — 87633 RESP VIRUS 12-25 TARGETS: CPT

## 2019-01-25 PROCEDURE — 71046 X-RAY EXAM CHEST 2 VIEWS: CPT

## 2019-01-25 PROCEDURE — 94640 AIRWAY INHALATION TREATMENT: CPT

## 2019-01-25 PROCEDURE — 87486 CHLMYD PNEUM DNA AMP PROBE: CPT

## 2019-01-25 PROCEDURE — 82947 ASSAY GLUCOSE BLOOD QUANT: CPT

## 2019-01-25 PROCEDURE — 87798 DETECT AGENT NOS DNA AMP: CPT

## 2019-01-25 PROCEDURE — 82435 ASSAY OF BLOOD CHLORIDE: CPT

## 2019-01-25 PROCEDURE — 84295 ASSAY OF SERUM SODIUM: CPT

## 2019-01-25 PROCEDURE — 85730 THROMBOPLASTIN TIME PARTIAL: CPT

## 2019-01-25 PROCEDURE — 82803 BLOOD GASES ANY COMBINATION: CPT

## 2019-01-25 PROCEDURE — 82330 ASSAY OF CALCIUM: CPT

## 2019-01-25 PROCEDURE — 80053 COMPREHEN METABOLIC PANEL: CPT

## 2019-01-25 PROCEDURE — 87581 M.PNEUMON DNA AMP PROBE: CPT

## 2019-01-25 PROCEDURE — 85610 PROTHROMBIN TIME: CPT

## 2019-01-25 PROCEDURE — 71275 CT ANGIOGRAPHY CHEST: CPT | Mod: 26

## 2019-01-25 PROCEDURE — 99234 HOSP IP/OBS SM DT SF/LOW 45: CPT

## 2019-01-25 PROCEDURE — 96374 THER/PROPH/DIAG INJ IV PUSH: CPT | Mod: XU

## 2019-01-25 RX ORDER — ATORVASTATIN CALCIUM 80 MG/1
1 TABLET, FILM COATED ORAL
Qty: 0 | Refills: 0 | COMMUNITY

## 2019-01-25 RX ORDER — METOPROLOL TARTRATE 50 MG
1 TABLET ORAL
Qty: 0 | Refills: 0 | COMMUNITY

## 2019-01-25 RX ORDER — LEVOTHYROXINE SODIUM 125 MCG
1 TABLET ORAL
Qty: 0 | Refills: 0 | COMMUNITY

## 2019-01-25 RX ORDER — IPRATROPIUM/ALBUTEROL SULFATE 18-103MCG
3 AEROSOL WITH ADAPTER (GRAM) INHALATION EVERY 4 HOURS
Qty: 0 | Refills: 0 | Status: DISCONTINUED | OUTPATIENT
Start: 2019-01-25 | End: 2019-01-28

## 2019-01-25 RX ORDER — IPRATROPIUM/ALBUTEROL SULFATE 18-103MCG
3 AEROSOL WITH ADAPTER (GRAM) INHALATION
Qty: 84 | Refills: 0 | OUTPATIENT
Start: 2019-01-25 | End: 2019-01-31

## 2019-01-25 RX ADMIN — Medication 3 MILLILITER(S): at 06:18

## 2019-01-25 RX ADMIN — Medication 3 MILLILITER(S): at 09:56

## 2019-01-25 RX ADMIN — Medication 100 MILLIGRAM(S): at 03:39

## 2019-01-25 RX ADMIN — Medication 3 MILLILITER(S): at 02:47

## 2019-01-25 NOTE — ED CDU PROVIDER DISPOSITION NOTE - CLINICAL COURSE
68 year old male w/ pmhx HTN, HLD, hypothyroid presents to ED c/o persistent sob for 3 weeks. + productive cough with yellow phlegm. pt has been seen by PCP 2 times and urgent care 2 times since onset and diagnosed with bronchitis. pt has finished course of Doxy and medrol dose pack and has also been using nebulizer at home. He states he was having some relief with the nebulizer but came to ED for persistent symptoms. Denies fevers, chills, chest pain, sinus congestion, sore throat, ear pain, abdominal pain, n/v/d, back pain, problems going to the bathroom/walking.   In ED, WBC 11.8, entero/rhino + on rvp, other labs unremarkable, CXR negative, CTA chest negative for PE. pt sent to CDU for duonebs, continuous pulse ox and monitoring.   In CDU, ___________. 68 year old male w/ pmhx HTN, HLD, hypothyroid presents to ED c/o persistent sob for 3 weeks. + productive cough with yellow phlegm. pt has been seen by PCP 2 times and urgent care 2 times since onset and diagnosed with bronchitis. pt has finished course of Doxy and medrol dose pack and has also been using nebulizer at home. He states he was having some relief with the nebulizer but came to ED for persistent symptoms. Denies fevers, chills, chest pain, sinus congestion, sore throat, ear pain, abdominal pain, n/v/d, back pain, problems going to the bathroom/walking.   In ED, WBC 11.8, entero/rhino + on rvp, other labs unremarkable, CXR negative, CTA chest negative for PE. pt sent to CDU for duonebs, continuous pulse ox and monitoring.   In CDU, symptoms improved, and pt was discharged home.

## 2019-01-25 NOTE — ED CDU PROVIDER INITIAL DAY NOTE - DETAILS
Asthma Exacerbation  - frequent re-eval  - vitals q4hrs  - continuous pulse ox, neb tx q 4hrs, peak flow q 4hrs, steroids  - discussed case with attending Dr. ABISAI Hale

## 2019-01-25 NOTE — ED ADULT NURSE REASSESSMENT NOTE - NS ED NURSE REASSESS COMMENT FT1
Pt received from SABRINA Hay. Pt oriented to CDU & plan of care was discussed. Pt states he feels much better. Pt denies any SOB or difficulty breathing. On examination decreased air movement noted and wheezing heard throughout. Pt placed on continuous pulse ox for further management >94% on room air. Safety & comfort measures maintained. Call bell in reach. Will continue to monitor.

## 2019-01-25 NOTE — ED CDU PROVIDER DISPOSITION NOTE - CARE PROVIDER_API CALL
Rod Mckee), Internal Medicine  [address] [place] [phone]  Phone: [phone]  Fax: [phone]

## 2019-01-25 NOTE — ED CDU PROVIDER DISPOSITION NOTE - PLAN OF CARE
1. Continue your home medications (including nebulizer as needed) along with Prednisone taper for next few days AND Tessalon Perles 100mg by mouth three times/day.  2. Drink PLENTY of fluids to stay hydrated.  3. You will need to follow-up with your PMD and/or pulmonologist in 2-3 days for your asthma exacerbation. A copy of your results were given to you to bring to your appointment.  4. Return to ER for worsening shortness of breath, fevers, or any other concerns.

## 2019-01-25 NOTE — ED CDU PROVIDER INITIAL DAY NOTE - PROGRESS NOTE DETAILS
CDU NOTE DOUGLAS BORGES: Pt resting comfortably, feeling well aside from cough and wheezing. states duoneb helped a bit. NAD, VSS. pulse ox 92-98 room air. Lungs: slight improvement of wheezing though still with diffuse wheezing. will continue monitoring and duonebs. pt feeling better with less cough and no sob or cook.  P/E - alert, nad, lungs - fine expiratory wheezing.  good air entry bilat. O2Sat 96% RA.  Pt stable for D/C.  will Rx Prednisone for 4 days and pt to f/u with PMD in the next week Pt comfortable. No complaints. Vital signs stable. Will continue to observe and reassess. b/l expiratory wheezes. -Ernestina Lindsey PA-C

## 2019-01-25 NOTE — ED CDU PROVIDER INITIAL DAY NOTE - OBJECTIVE STATEMENT
68 year old male w/ pmhx HTN, HLD, hypothyroid presents to ED c/o persistent sob for 3 weeks. + productive cough with yellow phlegm. pt has been seen by PCP 2 times and urgent care 2 times since onset and diagnosed with bronchitis. pt has finished course of Doxy and medrol dose pack and has also been using nebulizer at home. He states he was having some relief with the nebulizer but came to ED for persistent symptoms. Denies fevers, chills, chest pain, sinus congestion, sore throat, ear pain, abdominal pain, n/v/d, back pain, problems going to the bathroom/walking.   In ED, WBC 11.8, entero/rhino + on rvp, other labs unremarkable, CXR negative, CTA chest negative for PE. pt sent to CDU for duonebs, continuous pulse ox and monitoring.     PMD Dr. Rod Mckee

## 2019-01-25 NOTE — ED CDU PROVIDER DISPOSITION NOTE - NSFOLLOWUPINSTRUCTIONS_ED_ALL_ED_FT
1. Continue your home medications (including nebulizer as needed) along with Prednisone for next few days.  2. Drink PLENTY of fluids to stay hydrated.  3. You will need to follow-up with your PMD in 2-3 days. A copy of your results were given to you to bring to your appointment.  4. Return to ER for worsening shortness of breath, fevers, or any other concerns.

## 2021-03-15 ENCOUNTER — APPOINTMENT (OUTPATIENT)
Dept: ORTHOPEDIC SURGERY | Facility: CLINIC | Age: 71
End: 2021-03-15

## 2021-03-17 ENCOUNTER — APPOINTMENT (OUTPATIENT)
Dept: ORTHOPEDIC SURGERY | Facility: CLINIC | Age: 71
End: 2021-03-17
Payer: COMMERCIAL

## 2021-03-17 ENCOUNTER — TRANSCRIPTION ENCOUNTER (OUTPATIENT)
Age: 71
End: 2021-03-17

## 2021-03-17 VITALS
BODY MASS INDEX: 32.08 KG/M2 | HEART RATE: 97 BPM | WEIGHT: 250 LBS | DIASTOLIC BLOOD PRESSURE: 87 MMHG | SYSTOLIC BLOOD PRESSURE: 125 MMHG | HEIGHT: 74 IN

## 2021-03-17 DIAGNOSIS — M47.817 SPONDYLOSIS W/OUT MYELOPATHY OR RADICULOPATHY, LUMBOSACRAL REGION: ICD-10-CM

## 2021-03-17 DIAGNOSIS — M54.5 LOW BACK PAIN: ICD-10-CM

## 2021-03-17 PROCEDURE — 72100 X-RAY EXAM L-S SPINE 2/3 VWS: CPT

## 2021-03-17 PROCEDURE — 99072 ADDL SUPL MATRL&STAF TM PHE: CPT

## 2021-03-17 PROCEDURE — 99203 OFFICE O/P NEW LOW 30 MIN: CPT

## 2021-03-17 RX ORDER — CYCLOBENZAPRINE HYDROCHLORIDE 10 MG/1
10 TABLET, FILM COATED ORAL 3 TIMES DAILY
Qty: 60 | Refills: 0 | Status: ACTIVE | COMMUNITY
Start: 2021-03-17 | End: 1900-01-01

## 2021-03-17 NOTE — DISCUSSION/SUMMARY
[de-identified] : We discussed further treatment options.  He will try some physical therapy and continue with the muscle relaxant.  MRI if not improved.

## 2021-03-17 NOTE — HISTORY OF PRESENT ILLNESS
[de-identified] : Mr. LORETO KHAN  is a 70 year old male who presents with one week of left lumbar pain without any specific cause or trauma.  He has some mild left thigh discomfort.  His PCP prescribed muscle relaxers and he feels slightly better.  The first few days he could not walk.  Normal bowel and bladder control.   Denies any recent fevers, chills, sweats, weight loss, or infection.\par \par The patients past medical history, past surgical history, medications, allergies, and social history were reviewed by me today with the patient and documented accordingly.  In addition, the patient's family history, which is noncontributory to their visit, was also reviewed.\par

## 2021-03-17 NOTE — PHYSICAL EXAM
[Antalgic] : not antalgic [de-identified] : Examination of the lumbar spine reveals no midline tenderness palpation, step-offs, or skin lesions. Decreased range of motion with respect to flexion, extension, lateral bending, and rotation. No tenderness to palpation of the sciatic notch. No tenderness palpation of the bilateral greater trochanters. No pain with passive internal/external rotation of the hips. No instability of bilateral lower extremities.  Negative PALMER. Negative straight leg raise bilaterally. No bowstring. Negative femoral stretch.  He does have some chronic right dorsiflexion weakness graded 4 out of 5 but otherwise intact strength. Grossly intact sensation to light touch bilateral lower extremities. 1+ patellar and Achilles reflexes. Downgoing Babinski. No clonus. Intact proprioception. Palpable pulses. No skin lesion and no edema on the right and left lower extremities. [de-identified] : AP lateral lumbar x-rays reveals multilevel spondylosis without instability or aggressive lesions

## 2021-10-19 NOTE — REVIEW OF SYSTEMS
Infectious Disease Progress Note    S: patient seen and examined. ECMO. Afebrile. No new symptoms.     Review of Systems:  Pertinent items are noted in subjective part of the note above   Constitutional:  Denies fever, chills, or sweats.   Eyes: Denies visual changes.  ENT: No hoarseness . No sore throat.   Respiratory:  No cough.  No shortness of breath.    Cardiovascular:  No chest pain.  No palpitations.    Gastrointestinal:  No abdominal pain.  No nausea, vomiting or diarrhea.  CNS:  No numbness.  No weakness.  No headaches   Musculoskeletal:  No joint pain.   Extremities: no edema.   Dermatologic:  No rash.     Psychiatric: No agitation  :  Denies urinary urgency, frequency or dysuria.  Hematology: No adenopathy.     O:Temp (24hrs), Av.1 °F (36.7 °C), Min:97.6 °F (36.4 °C), Max:98.6 °F (37 °C)  Systolic (24hrs), Av , Min:89 , Max:142   Diastolic (24hrs), Av, Min:46, Max:113      Visit Vitals  /60   Pulse 93   Temp 98.6 °F (37 °C)   Resp (!) 32   Ht 5' 5\" (1.651 m)   Wt 89.8 kg (198 lb)   SpO2 97%   BMI 32.95 kg/m²         GEN: NAD  HEENT: NC/AT,   NECK: supple,  CV: S1, S2, tachycardic   PULM: diminished breath sounds  ABD: +bs, soft, NT/ND  MSK: no c/c/e  NEURO: alert  SKIN: no rashes    Medications:    Current Facility-Administered Medications   Medication Dose Route Frequency Provider Last Rate Last Admin   • metoPROLOL tartrate (LOPRESSOR) tablet 50 mg  50 mg Oral Q6H Cedrick Edwards MD   50 mg at 10/19/21 0609   • sodium chloride 0.9% infusion   Intravenous Continuous PRN Cedrick Edwards MD   Completed at 10/18/21 1208   • sodium chloride 0.9% infusion   Intravenous Continuous PRN Cedrick Edwards MD       • melatonin tablet 6 mg  6 mg Oral Nightly Kuldeep Frederick MD   6 mg at 10/18/21 2120   • polyethylene glycol (MIRALAX) packet 17 g  17 g Oral Daily Kuldeep Frederick MD   17 g at 10/18/21 0828   • guaiFENesin-DM) (ROBITUSSIN DM) 100-10 MG/5ML syrup 10 mL  10 mL Oral Q4H PRN  Deidra Herring, CNS       • insulin glargine (LANTUS) injection 12 Units  12 Units Subcutaneous 2 times per day Rishi Acevedo MD   12 Units at 10/18/21 2121   • cefepime (MAXIPIME) 2,000 mg in sodium chloride 0.9 % 100 mL IVPB  2,000 mg Intravenous 3 times per day Terrell Harden, DO 25 mL/hr at 10/19/21 0659 Rate Verify at 10/19/21 0659   • voriconazole (VFEND) 40 MG/ML suspension 300 mg  300 mg Per NG tube 2 times per day Terrell Bennette, DO   300 mg at 10/19/21 0400   • bisacodyl (DULCOLAX) suppository 10 mg  10 mg Rectal Daily PRN Cedrick Edwards MD   10 mg at 10/12/21 1800   • sodium chloride (PF) 0.9 % injection 10-30 mL  10-30 mL Injection 2 times per day Cedrick Edwards MD   10 mL at 10/18/21 2120   • sodium chloride (PF) 0.9 % injection 10 mL  10 mL Injection PRN Cedrick Edwards MD   10 mL at 10/15/21 0834   • sodium chloride (PF) 0.9 % injection 20 mL  20 mL Injection PRN Cedrick Edwards MD       • docusate sodium (COLACE) 50 MG/5ML liquid 100 mg  100 mg Oral BID Cedrick Edwards MD   100 mg at 10/18/21 2121   • [Held by provider] argatroban (ACOVA) 125 mg/125 mL in sodium chloride 0.9 % infusion  0.125 mcg/kg/min (Dosing Weight) Intravenous Continuous Cedrick Edwards MD   Completed at 10/05/21 1916   • pantoprazole (PROTONIX) 40 MG/20ML (compounded) suspension 40 mg  40 mg Per NG tube Daily Sanaz Simms PA-C   40 mg at 10/18/21 0830   • LORazepam (ATIVAN) injection 2 mg  2 mg Intravenous Q8H PRN Db White MD   2 mg at 10/12/21 1230   • insulin lispro (ADMELOG,HumaLOG) - Correction Dose   Subcutaneous 4 times per day Rishi Acevedo MD   2 Units at 10/19/21 0617   • esmolol (BREVIBLOC) 2,000 mg/100 mL in sodium chloride 0.9 % infusion  0-200 mcg/kg/min (Dosing Weight) Intravenous Continuous Richard White MD 19.6 mL/hr at 10/19/21 0659 75 mcg/kg/min at 10/19/21 0659   • calcium gluconate 1,000 mg in sodium chloride 0.9 % 250 mL IVPB  1,000 mg Intravenous PRN Sanaz Simms,  CARLOS   Completed at 09/26/21 0600   • lidocaine (cardiac) (XYLOCAINE) PF syringe 90 mg  1 mg/kg Intravenous PRN Sanaz Simms PA-C       • sodium bicarbonate 8.4 % injection 50 mEq  50 mEq Intravenous PRN Sanaz Simms PA-C   50 mEq at 10/09/21 0345   • acetaminophen (TYLENOL) tablet 650 mg  650 mg Oral Q6H PRN Sanaz Simms PA-C       • morphine injection 4 mg  4 mg Intravenous Q1H PRN Sanaz Simms PA-C       • fentaNYL (SUBLIMAZE) injection 25 mcg  25 mcg Intravenous Q1H PRN Sanaz Simms PA-C   25 mcg at 10/03/21 2022   • ondansetron (ZOFRAN ODT) disintegrating tablet 4 mg  4 mg Oral Q12H PRN Sanaz Simms PA-C        Or   • ondansetron (ZOFRAN) injection 4 mg  4 mg Intravenous Q6H PRN Sanaz Simms PA-C       • niCARdipine (CARDENE) 40 mg/200 mL in NaCl infusion  0-15 mg/hr Intravenous Continuous PRN Sanaz Simms PA-C   Completed at 10/15/21 0333   • lidocaine (XYLOCAINE) 2,000 mg/500 mL in dextrose 5 % infusion  2 mg/min Intravenous Continuous PRN Sanaz Simms PA-C       • sodium biphosphate (FLEET) 7-19 GM/118ML enema 1 enema  1 enema Rectal Once PRN Sanaz Simms PA-C       • dexMEDETomidine (PRECEDEX) 400 mcg/100 mL in sodium chloride 0.9 % infusion  0-0.7 mcg/kg/hr (Dosing Weight) Intravenous Continuous PRN Sanaz Simms PA-C   Paused at 10/14/21 1902   • sodium chloride 0.9% infusion   Intravenous Continuous Cedrick Edwards MD   Completed at 10/18/21 0702   • sodium chloride 0.9% infusion   Intravenous Continuous Cedrick Edwards MD   Completed at 10/19/21 0619   • dextrose 50 % injection 25 g  25 g Intravenous PRN Sanaz Simms PA-C       • dextrose 50 % injection 12.5 g  12.5 g Intravenous PRN Sanaz Simms PA-C       • glucagon (GLUCAGEN) injection 1 mg  1 mg Intramuscular PRN Sanaz Simms PA-C       • dextrose (GLUTOSE) 40 % gel 15 g  15 g Oral PRN Sanaz KOROMA  CARLOS Simms       • dextrose (GLUTOSE) 40 % gel 30 g  30 g Oral PRN Sanaz Simms PA-C       • magnesium sulfate 2 g in 50 mL premix IVPB  2 g Intravenous PRN Cedrick Edwards MD   Completed at 10/13/21 0240   • potassium CHLORIDE 40 mEq/100 mL IVPB premix  40 mEq Intravenous Q4H PRN Cedrick Edwards MD   Paused at 10/18/21 1838   • potassium CHLORIDE (KLOR-CON) packet 40 mEq  40 mEq Per NG tube PRN Cedrick Edwards MD   40 mEq at 10/17/21 1400   • levothyroxine (SYNTHROID, LEVOTHROID) tablet 100 mcg  100 mcg Oral Daily Helen Parker MD   100 mcg at 10/19/21 0609       Infusions  • sodium chloride 0.9% infusion Stopped (10/18/21 1208)   • sodium chloride 0.9% infusion     • [Held by provider] argatroban (ACOVA) 125 mg/125 mL in sodium chloride 0.9 % infusion Stopped (10/05/21 1916)   • esmolol (BREVIBLOC) 2,000 mg/100 mL in sodium chloride 0.9 % infusion 75 mcg/kg/min (10/19/21 0659)   • niCARdipine (CARDENE) 40 mg/200 mL in NaCl infusion Stopped (10/15/21 0333)   • lidocaine (XYLOCAINE) 2,000 mg/500 mL in dextrose 5 % infusion     • dexMEDETomidine (PRECEDEX) 400 mcg/100 mL in sodium chloride 0.9 % infusion Stopped (10/14/21 1902)   • sodium chloride 0.9% infusion Stopped (10/18/21 0702)   • sodium chloride 0.9% infusion Stopped (10/19/21 0619)     sodium chloride, Last Rate: Stopped (10/18/21 1208)  sodium chloride  [Held by provider] argatroban _125 mg/125 mL in NS infusion adults or peds > 15 kg (1,000 mcg/mL), Last Rate: Stopped (10/05/21 1916)  esmolol (BREVIBLOC) infusion, Last Rate: 75 mcg/kg/min (10/19/21 0659)  niCARdipine (CARDENE) infusion, Last Rate: Stopped (10/15/21 0333)  lidocaine (XYLOCAINE) infusion  dexmedetomidine (PRECEDEX) infusion, Last Rate: Stopped (10/14/21 1902)  sodium chloride, Last Rate: Stopped (10/18/21 0702)  sodium chloride, Last Rate: Stopped (10/19/21 0619)        PRN  sodium chloride, sodium chloride, guaiFENesin-DM), bisacodyl, sodium chloride (PF), sodium  chloride (PF), LORazepam, calcium gluconate in sodium chloride 0.9% IVPB (customizable for apheresis), lidocaine (cardiac), sodium bicarbonate, acetaminophen, morphine injection, fentaNYL, ondansetron **OR** ondansetron, niCARdipine (CARDENE) infusion, lidocaine (XYLOCAINE) infusion, sodium biphosphate, dexmedetomidine (PRECEDEX) infusion, dextrose, dextrose, glucagon, dextrose, dextrose, magnesium sulfate, potassium CHLORIDE, potassium CHLORIDE   Intake/Output last 3 shifts:  I/O last 3 completed shifts:  In: 4250.2 [P.O.:250; I.V.:758.1; Blood:1059; NG/GT:1800; IV Piggyback:383]  Out: 2115 [Urine:2115]  Intake/Output this shift:  No intake/output data recorded.     Labs:  Recent Labs   Lab 10/19/21  0326   WBC 10.4   RBC 3.45*   HGB 10.1*   HCT 31.4*   *      Recent Labs   Lab 10/19/21  0326   SODIUM 135   POTASSIUM 4.1   CHLORIDE 101   CO2 34*   BUN 19   CREATININE 0.38*   CALCIUM 8.2*   ALBUMIN 3.8   BILIRUBIN 0.4   ALKPT 91   GPT 44   AST 30   GLUCOSE 136*      Microbiology Results  (Last 10 results in the past 7 days)    Specimen   Gram Smear   Culture Result   Status         10/12/21  1616          Adequate quality specimen. Acute inflammation with moderate/many neutrophils present.             Rare Gram negative bacilli.            10/12/21  1132          Inadequate quality specimen. Heavy oral contamination. No culture performed. Recommend recollection if clinically indicated.                No components found for: URINALYSIS  No components found for: SPT  No components found for: INFABAG  No components found for: WDC    Imaging:    XR CHEST AP OR PA 1 VIEW  Narrative: Patient: DEV DANIELSON  Time Out: 03:41  Exam(s): FILM CXR 1 VIEW     EXAM:    XR Chest, 1 View    CLINICAL HISTORY:     Reason for exam: ECMO. Covid-19. Pneumonia due to coronavirus disease   2019. Hypoxemia. Hyperglycemia, unspecified. Adverse effect of   glucocorticoids and synthetic analogues, initial  [Arthralgia] : arthralgia encounter.    TECHNIQUE:    Frontal view of the chest.    COMPARISON:    October 18, 2021    FINDINGS:    Lungs:  Low lung volumes and severe interstitial infiltrates throughout   both lungs, unchanged.    Pleural space:  Unremarkable.  No pneumothorax.    Heart:  The cardiac silhouette is partially obscured.    Mediastinum:  Unremarkable.    Bones/joints:  Unremarkable.    Tubes, lines and devices:  Right IJ ECMO catheter with sidehole markers   in the right atrium and distal tip in the distal right main pulmonary   artery.  Left side PICC line terminates in the superior vena cava.    Dobbhoff tube extends below the diaphragm.  Impression: 1.  Right IJ ECMO catheter with sidehole markers in the right atrium and   distal tip in the distal right main pulmonary artery.  Left side PICC   line terminates in the superior vena cava.  Dobbhoff tube extends below   the diaphragm.  2.  Low lung volumes and severe interstitial infiltrates throughout both   lungs, unchanged.    Electronically signed by Carl Reaves MD on 10 19 21 at 03:41        MICROBIOLOGY    Microbiology Results  (Last 10 results in the past 7 days)    Specimen   Gram Smear   Culture Result   Status         10/12/21  1616          Adequate quality specimen. Acute inflammation with moderate/many neutrophils present.             Rare Gram negative bacilli.            10/12/21  1132          Inadequate quality specimen. Heavy oral contamination. No culture performed. Recommend recollection if clinically indicated.               Sputum cx PSAR (sensitivities noted and R to meropenem)    ABX:  - IV vanc (10/8-10/13)   -zosyn (10/8-10/11)   -meropenem (10/11- 10/14)      ASSESSMENT    1. COVID-19 PNA  2. Acute hypoxic resp failure secondary to above, now on ECMO  3. Leukocytosis: resolved  4. Bacterial Pneumonia: MSSA, Strep, from 9/15 BAL culture: completed course of treatment  5. Aspergillus Pneumonia: from 9/15 BAL culture  6. Obesity  7. Shock:  [Negative] : Heme/Lymph improved  8. epitaxix- improving      Recommendations:    - continue voriconazole (9/17-) for Aspergillus, course TBD  - vori level from 9/23 was 4.3  - repeat vori level from 10/7 was 0.7 will increase dose to 300mg BID 10/12/21   - start cefepime (10/14-present) based on sensitivities to PSAR (LA to cefepime 8 will monitor clinical response) x 10 days stop 10/23  -follow up voriconazole trough on 10/19/21  - completed remdesivir, baricitinib  -discussed with BECKI Harden DO  10/19/2021  7:14 AM

## 2023-04-29 NOTE — PATIENT PROFILE ADULT. - PRO ARRIVE FROM
Patient discharged in stable condition  Discharge instruction, F/U appointments and medications reviewed with patient  Questions addressed  home

## 2025-05-14 NOTE — DISCHARGE NOTE ADULT - DISCHARGE TO
Preop Patient Instructions for [facility] Surgeries and Procedures     Domingo YBARRA! We want you to have the best experience! Thank you for choosing us and trusting us with your care. You have an upcoming procedure scheduled at:    [facility] located at:  [address] [place] [address]    If you have any questions regarding your preop instructions, please call:  [phone] 8:00am-4:30pm M-F    Call your surgeon immediately if you feel that you cannot make it for procedure for any reason (i.e. cold symptoms, fever, family emergency etc.).    **Please do NOT follow the arrival time on your live well [place], it is incorrect and doing so may cause your procedure to be cancelled (we are working on the issue).**    A surgical estimate may be prepared for your upcoming procedure. If you have questions about the out-of-pocket amount due on the estimate, you may contact the Patient Contact Center for assistance at [phone] -option #4 Estimates.    ON THE DAY OF YOUR PROCEDURE  WHAT SHOULD I BRING?     Photo ID and insurance card  Please do not bring any valuables including jewelry, or weapons including pocket knives to the hospital.    Bring any asthma/COPD inhalers, eyeglasses, dentures, or hearing aids (bring cases)  Home medication list    IF you are staying overnight bring:  CPAP machine - if you use one for sleep apnea.  Self-care items such as hairbrush or hair tie.    WHEN SHOULD I ARRIVE?    Your arrival date/time to [facility] is 5- at 11:30 AM .   Please arrive 15 minutes before this time to allow time for checking in and registering. Main entrance opens at 5am, do not arrive before then.      You will ONLY receive a call the day before procedure ONLY if there is a change to your noted arrival time.      Free [employer] service is available Monday through Friday starting at 8am until 3:30pm.      WHERE DO I CHECK-IN?    Day Surgery - located on the 2nd floor.  ([phone])      TRANSPORTATION    IMPORTANT SAFETY! You must have a ride arranged to get home and have a responsible adult stay with you for 24 hours after the procedure. You cannot drive or use public transportation or rideshare by yourself. If you are unable to have someone with you the night of procedure, please notify your surgeon or proceduralist as soon as possible.      WHAT IS THE VISITOR POLICY?     Yes, two visitors are allowed due to space limitations. Additional visitors would be directed to the waiting area. More visitors may be allowed in the overnight units during visitor hours. Minor visitors must be always accompanied by an adult.  Masking is optional for patients, visitors, and the clinical team with exceptions for higher risk patients.  The clinical team has discretion to limit visitors if a visitor presents a health or safety risk to the patient, themselves, or the care team.  Visitor accommodations are subject to change depending on community infection concerns.    BEFORE THE DAY OF PROCEDURE: 5-15 THURSDAY OR 5-16 FRIDAY  TESTING NEEDED PRIOR TO YOUR PROCEDURE:  CBC, TYPE AND SCREEN, and URINALYSIS        At [facility] on:  [facility]?   [address], [place]?   [phone]?      Walk-in labs M-F [id number]?   CXR/EKG--Sa [id number]?      OR    [address] [facility] [phone]  Monday - Friday: 7am - 3pm (closed for lunch 12-1230pm)  Saturday: 7am - 11:15am  (Labs on 2nd floor;   COVID appts, EKG and Xrays (no appt required) on 1st Floor )        DIET INSTRUCTIONS    DO NOT EAT OR DRINK AFTER MIDNIGHT PRIOR TO YOUR PROCEDURE.   This includes hard candy, gum and mints. Eating after midnight could cause a delay or cancellation of your procedure/surgery.    Sips of water with any medications you were instructed to take the day of procedure is allowed.    EXCEPTION- you may have only water to drink UP TO 2 hours prior to your  ARRIVAL time.       MEDICATION INSTRUCTIONS    Before Procedure Do Not Take These Medications    STOP herbal supplements, Vitamin E (and products containing Vitamin E), multivitamins, prenatal vitamins, Coenzyme Q10, and omega fish oil 1 week prior to procedure.     DO NOT take any vitamins, supplements or probiotics on the morning of the procedure.    DO NOT consume any products containing cannabis/CBD/marijuana for 3 days prior to your procedure. If you smoke marijuana daily- Do not smoke on the day of the procedure    STOP taking non-steroidal, anti-inflammatory drugs, otherwise known as NSAIDS (Aleve, ibuprofen, Motrin, Advil and Naproxen) for at least 5 days, or longer per surgeons instructions.    If you smoke tobacco/vape, do not do this for at least 12 hours prior to your procedure.    Continue taking the rest of your evening medications as scheduled, unless it is LISTED AS a medication to hold prior to procedure.     Continue all other medications unless an alternative plan was advised with the surgeon and/or specialist.      The morning of your procedure, take only the following medications  before coming to the hospital. If they are pills, you may take them with a small sip of water: NONE.      HOW TO DRESS, SHOWER and BATHE BEFORE PROCEDURE?   Hibiclens/CHG Instructions  Due to the procedure you are having, your surgeon has recommended for you to shower with medicated soap to help prevent surgical site infections. Please read the below instructions prior to using this soap. It is available over the counter     Before procedure, patients take an important role in prevention of a surgical site infection by using a special wash. A preoperative shower or bath with a special soap called chlorhexidine gluconate (CHG) 4% will need to be done. A common name for this soap is Hibiclens or Aplicare, but any of the mentioned brands are acceptable for use. If there is an allergy to CHG or for any other reason that  washing with CHG is not possible, please follow the instructions below BUT use an antibacterial soap.  Wash genital area with regular soap or water only, NOT with CHG.  Turn off the water to prevent rinsing the CHG soap off too soon.  Apply CHG soap and leave on for one minute. Pay special attention to the area of procedure, be careful as this can cause the tub floor to be slippery.  If having back procedure, please have someone assist in applying CHG to the back area.  Do not use regular soap after applying and rinsing CHG.  **PLEASE ALSO READ THE BELOW INSTRUCTIONS ABOUT SHOWERING AND AFTERCARE**    Patient Instructions for Skin Cleaning for baths or showers:  Please read the \"Drug Facts\" for CHG information and directions on the bottle:  Do not use on the head or face, eyes, ears or mouth.  Do not use in the genital area.  Do not use directly on stoma for ostomy.  Do not use if allergic to chlorhexidine gluconate or any other Ingredient listed. Instead use an antibacterial soap.  Steps for a Bath or Shower the Night Before and Morning of Procedure:    When bathing or showering wash hair as usual with regular shampoo.  Rinse hair and body thoroughly to remove any shampoo residue.  Wash face with regular soap or water only.  Thoroughly rinse body with warm water from the neck down.  Avoid any open skin areas including open wounds. Your physician should be made aware of these wounds and provide instructions for them.  Rinse thoroughly with warm water.  Pat dry with a fresh towel.  Do not apply lotion, powders or perfumes.  Do not wear jewelry, this also applies to permanent jewelry. If it is not removed it could result in a burn, or the cancellation of your procedure.  Do not wear makeup, including mascara.  Put on clean clothes and sleep in fresh bed sheets.  Sleeping with pet animals can be a source of infection.  *If a cast or splint is in place that is not to be removed, then sponge bathe exposed skin areas from  the neck down.  Please brush and floss the night before and morning of your procedure with a new toothbrush.  DO NOT: shave any part of the surgical area for at least 2 days prior to procedure. Tiny skin cuts and abrasions from shaving in the surgical area can increase risk of infection.       EMAIL   Home